# Patient Record
Sex: FEMALE | Race: WHITE | HISPANIC OR LATINO
[De-identification: names, ages, dates, MRNs, and addresses within clinical notes are randomized per-mention and may not be internally consistent; named-entity substitution may affect disease eponyms.]

---

## 2018-05-23 PROBLEM — Z00.00 ENCOUNTER FOR PREVENTIVE HEALTH EXAMINATION: Status: ACTIVE | Noted: 2018-05-23

## 2018-08-07 ENCOUNTER — APPOINTMENT (OUTPATIENT)
Dept: FAMILY MEDICINE | Facility: CLINIC | Age: 58
End: 2018-08-07

## 2019-11-25 ENCOUNTER — APPOINTMENT (OUTPATIENT)
Dept: RHEUMATOLOGY | Facility: CLINIC | Age: 59
End: 2019-11-25

## 2019-11-26 ENCOUNTER — APPOINTMENT (OUTPATIENT)
Dept: RHEUMATOLOGY | Facility: CLINIC | Age: 59
End: 2019-11-26

## 2021-08-01 ENCOUNTER — TRANSCRIPTION ENCOUNTER (OUTPATIENT)
Age: 61
End: 2021-08-01

## 2021-12-28 PROBLEM — E03.9 HYPOTHYROIDISM: Status: ACTIVE | Noted: 2021-12-28

## 2021-12-28 PROBLEM — E78.5 HYPERLIPIDEMIA: Status: ACTIVE | Noted: 2021-12-28

## 2021-12-28 PROBLEM — M19.90 ARTHRITIS: Status: ACTIVE | Noted: 2021-12-28

## 2021-12-28 RX ORDER — FLUTICASONE PROPIONATE 50 MCG
50 SPRAY, SUSPENSION NASAL
Refills: 0 | Status: ACTIVE | COMMUNITY

## 2021-12-28 RX ORDER — LEVOTHYROXINE SODIUM 0.14 MG/1
137 TABLET ORAL
Refills: 0 | Status: ACTIVE | COMMUNITY

## 2021-12-28 RX ORDER — ROSUVASTATIN CALCIUM 5 MG/1
5 TABLET, FILM COATED ORAL
Refills: 0 | Status: ACTIVE | COMMUNITY

## 2021-12-28 RX ORDER — LOSARTAN POTASSIUM 100 MG/1
100 TABLET, FILM COATED ORAL
Refills: 0 | Status: ACTIVE | COMMUNITY

## 2021-12-29 ENCOUNTER — APPOINTMENT (OUTPATIENT)
Dept: GYNECOLOGIC ONCOLOGY | Facility: CLINIC | Age: 61
End: 2021-12-29

## 2021-12-29 VITALS
HEART RATE: 79 BPM | RESPIRATION RATE: 16 BRPM | SYSTOLIC BLOOD PRESSURE: 139 MMHG | OXYGEN SATURATION: 97 % | WEIGHT: 181 LBS | DIASTOLIC BLOOD PRESSURE: 82 MMHG | HEIGHT: 60 IN | BODY MASS INDEX: 35.53 KG/M2

## 2021-12-29 DIAGNOSIS — E03.9 HYPOTHYROIDISM, UNSPECIFIED: ICD-10-CM

## 2021-12-29 DIAGNOSIS — M19.90 UNSPECIFIED OSTEOARTHRITIS, UNSPECIFIED SITE: ICD-10-CM

## 2021-12-29 DIAGNOSIS — E78.5 HYPERLIPIDEMIA, UNSPECIFIED: ICD-10-CM

## 2022-01-18 ENCOUNTER — APPOINTMENT (OUTPATIENT)
Dept: GYNECOLOGIC ONCOLOGY | Facility: CLINIC | Age: 62
End: 2022-01-18

## 2022-01-31 ENCOUNTER — FORM ENCOUNTER (OUTPATIENT)
Age: 62
End: 2022-01-31

## 2022-02-01 ENCOUNTER — APPOINTMENT (OUTPATIENT)
Dept: GYNECOLOGIC ONCOLOGY | Facility: CLINIC | Age: 62
End: 2022-02-01

## 2022-02-01 ENCOUNTER — APPOINTMENT (OUTPATIENT)
Dept: GYNECOLOGIC ONCOLOGY | Facility: CLINIC | Age: 62
End: 2022-02-01
Payer: COMMERCIAL

## 2022-02-01 VITALS
WEIGHT: 183 LBS | OXYGEN SATURATION: 97 % | DIASTOLIC BLOOD PRESSURE: 82 MMHG | HEIGHT: 60 IN | SYSTOLIC BLOOD PRESSURE: 158 MMHG | BODY MASS INDEX: 35.93 KG/M2 | HEART RATE: 84 BPM

## 2022-02-01 DIAGNOSIS — Z80.3 FAMILY HISTORY OF MALIGNANT NEOPLASM OF BREAST: ICD-10-CM

## 2022-02-01 DIAGNOSIS — R31.9 HEMATURIA, UNSPECIFIED: ICD-10-CM

## 2022-02-01 PROCEDURE — 76830 TRANSVAGINAL US NON-OB: CPT | Mod: 59

## 2022-02-01 PROCEDURE — 76857 US EXAM PELVIC LIMITED: CPT | Mod: 59

## 2022-02-01 PROCEDURE — 99204 OFFICE O/P NEW MOD 45 MIN: CPT | Mod: 25

## 2022-02-02 PROBLEM — Z80.3 FAMILY HISTORY OF MALIGNANT NEOPLASM OF BREAST: Status: ACTIVE | Noted: 2022-02-02

## 2022-02-02 RX ORDER — HYDROCHLOROTHIAZIDE 12.5 MG/1
12.5 TABLET ORAL
Qty: 90 | Refills: 0 | Status: ACTIVE | COMMUNITY
Start: 2022-01-17

## 2022-02-25 ENCOUNTER — APPOINTMENT (OUTPATIENT)
Dept: CT IMAGING | Facility: CLINIC | Age: 62
End: 2022-02-25
Payer: COMMERCIAL

## 2022-02-25 ENCOUNTER — OUTPATIENT (OUTPATIENT)
Dept: OUTPATIENT SERVICES | Facility: HOSPITAL | Age: 62
LOS: 1 days | End: 2022-02-25
Payer: COMMERCIAL

## 2022-02-25 DIAGNOSIS — R31.9 HEMATURIA, UNSPECIFIED: ICD-10-CM

## 2022-02-25 PROCEDURE — 74178 CT ABD&PLV WO CNTR FLWD CNTR: CPT

## 2022-02-25 PROCEDURE — 74178 CT ABD&PLV WO CNTR FLWD CNTR: CPT | Mod: 26

## 2022-03-01 NOTE — REVIEW OF SYSTEMS
[Dysuria] : no dysuria [Vaginal Discharge] : no vaginal discharge [Dyspareunia] : no dyspareunia [Incontinence] : no incontinence

## 2022-03-01 NOTE — ASSESSMENT
[FreeTextEntry1] : 60yo with PMB and hematuria, benign EMB.\par \par I discussed with patient that on US today her lining appears WNL. Its possible she has diagnosis of adenomyosis which could be causing the bleeding. It is also possible that her HTN is uncontrolled and could therefore also cause bleeding. Patient to monitor BP's over the next two weeks at time of VB and will report back with results. I would also like to get a CT urogram to rule out bladder as source of bleeding. If all testing negative will then recommend a hysterectomy. Patient to sign consent today to start surgical planning with assumption that other testing will be negative. \par \par I discussed at length with the patient the nature, purpose, risks, benefits, and alternatives of Robot assisted total laparoscopic hysterectomy, bilateral salpingo-oophorectom and cystoscopy.  The patient understands the risks to include (but not be limited to) bowel injury, bleeding (with the possible need for transfusion), bladder or ureteral injury, infections, deep venous thrombosis, and henrry-operative death.  The patient also understands that her surgery may not be able to be performed robotically and that she may need a laparotomy.  She also understands the limitations of robotic surgery and the possibility of missing a surgical complication with need for subsequent re-exploration.  She agrees to proceed.  She asked numerous questions which were answered to her satisfaction.  She understands the need for a pre-operative bowel preparation and agrees to comply with our instructions.  She also understands the rationale for a cystoscopy at the completion of the procedure and the potential risks of cystoscopy.\par \par

## 2022-03-01 NOTE — CHIEF COMPLAINT
[FreeTextEntry1] : [place] Office\par \par [facility] [phone] at [address] [place] [address]

## 2022-03-01 NOTE — HISTORY OF PRESENT ILLNESS
[FreeTextEntry1] : 60yo  LMP age 51 presents today with referral from Dr. Wilkinson for PMB. Patient with heavy VB prompting visit to the ED in 2021. Hg at that time noted to be 12 and patient was sent home as asymptomatic and stable. US also performed that visit revealed endometrial thickness of 15mm's. She underwent EMB on 21 with Dr. Wilkinson revealing disaggregated benign endometrium with focal breakdown and surface degenerative repair (papillary synctal metaplasia). Patient reports abnormal bleeding two years ago as well with benign D&C in . She has since has spotting. She does report new pelvic cramping over the past month or so as well as hematuria off and on. She denies any issues with bowel habits. \par \par  \par LPAP- 10/2021 negative for hpv and malignancy\par LMammo- Oct 2021, normal per pt\par LColonoscopy- , normal per pt\par LBone Density Scan- , no osteopenia or osteoporisis per pt\par

## 2022-03-01 NOTE — END OF VISIT
[FreeTextEntry3] : Written by Flori Morris PA-C, acting as scribe for Dr. Viry Vo.\par  [FreeTextEntry2] : This note accurately reflects the work and decisions made by me.\par

## 2022-03-01 NOTE — PHYSICAL EXAM
[de-identified] : 8mm sebaceous cyst noted at 9 oclock, [de-identified] : Patient was interviewed and examined with chaperone present. Name of Chaperone: Breanna Morris PA-C

## 2022-03-05 ENCOUNTER — LABORATORY RESULT (OUTPATIENT)
Age: 62
End: 2022-03-05

## 2022-03-07 LAB
APPEARANCE: CLEAR
BILIRUBIN URINE: NEGATIVE
BLOOD URINE: NEGATIVE
COLOR: YELLOW
GLUCOSE QUALITATIVE U: NEGATIVE
KETONES URINE: NEGATIVE
LEUKOCYTE ESTERASE URINE: NEGATIVE
NITRITE URINE: NEGATIVE
PH URINE: 6
PROTEIN URINE: ABNORMAL
SPECIFIC GRAVITY URINE: 1.03
UROBILINOGEN URINE: NORMAL

## 2022-03-11 ENCOUNTER — APPOINTMENT (OUTPATIENT)
Dept: GYNECOLOGIC ONCOLOGY | Facility: CLINIC | Age: 62
End: 2022-03-11

## 2022-03-11 NOTE — END OF VISIT
[FreeTextEntry3] : Follow up with PCP for clearance and evaluation of kidney function \par Proceed with scheduling TLH, BSO, cysto.  [Time Spent: ___ minutes] : I have spent [unfilled] minutes of time on the encounter.

## 2022-03-11 NOTE — HISTORY OF PRESENT ILLNESS
[Home] : at home, [unfilled] , at the time of the visit. [Other Location: e.g. Home (Enter Location, City,State)___] : at [unfilled] [Verbal consent obtained from patient] : the patient, [unfilled] [FreeTextEntry1] : Pt is a 62 yo with PMB with benign endometrial biopsy. She also had hematuria for which we ordered a CT scan. She presents to discuss results. No new gynecologic issues.

## 2022-03-11 NOTE — ASSESSMENT
[FreeTextEntry1] : Pt is a 60 yo with PMB and benign endometrial biopsy. No concerning findings on CT scan. We discussed that given the UA a urinary tract infection is unlikely. She does have protein in the urine, so there could be concern for kidney damage. We will be getting a serum creatinine preoperatively. I would like her to follow up with her PCP. I also encouraged her to start monitoring her BP daily and starting to keep a calendar as uncontrolled hypertension could result in these findings. I do not have a good explanation for the air bubbles in the bladder, but we will perform cystoscopy during the surgery to evaluate the bladder. \par \par We discussed that we will proceed with the hysterectomy given no concerning findings, but we do need to establish that her blood pressure is well controlled and optimized prior to surgery.

## 2022-03-31 ENCOUNTER — FORM ENCOUNTER (OUTPATIENT)
Age: 62
End: 2022-03-31

## 2022-04-13 ENCOUNTER — FORM ENCOUNTER (OUTPATIENT)
Age: 62
End: 2022-04-13

## 2022-04-28 ENCOUNTER — APPOINTMENT (OUTPATIENT)
Dept: GYNECOLOGIC ONCOLOGY | Facility: CLINIC | Age: 62
End: 2022-04-28

## 2022-07-28 ENCOUNTER — APPOINTMENT (OUTPATIENT)
Dept: GYNECOLOGIC ONCOLOGY | Facility: CLINIC | Age: 62
End: 2022-07-28
Payer: COMMERCIAL

## 2022-07-28 DIAGNOSIS — I10 ESSENTIAL (PRIMARY) HYPERTENSION: ICD-10-CM

## 2022-07-28 PROCEDURE — 99213 OFFICE O/P EST LOW 20 MIN: CPT

## 2022-07-28 NOTE — REASON FOR VISIT
[FreeTextEntry1] : [place] Location\par \par [facility] [place]. [phone]\par

## 2022-07-28 NOTE — ASSESSMENT
[FreeTextEntry1] : Pt is a 61 yo with PMB and benign endometrial biopsy. No concerning findings on CT scan. Scheduled for TLH, BSO cysto at [facility] on 9/8/22. She has no new concerns but reports she is having some spotting. \par \par

## 2022-07-28 NOTE — HISTORY OF PRESENT ILLNESS
[FreeTextEntry1] : Pt is a 61 yo with PMB and benign endometrial biopsy. No concerning findings on CT scan. She was last seen in March and signed consent for surgery ([facility] BSO cysto). She is scheduled for surgery on 9/8/22. She returns to the office today for pre op questions and to sign consent form.

## 2022-09-08 ENCOUNTER — RESULT REVIEW (OUTPATIENT)
Age: 62
End: 2022-09-08

## 2022-09-19 ENCOUNTER — NON-APPOINTMENT (OUTPATIENT)
Age: 62
End: 2022-09-19

## 2022-09-20 ENCOUNTER — FORM ENCOUNTER (OUTPATIENT)
Age: 62
End: 2022-09-20

## 2022-09-21 ENCOUNTER — APPOINTMENT (OUTPATIENT)
Dept: GYNECOLOGIC ONCOLOGY | Facility: CLINIC | Age: 62
End: 2022-09-21

## 2022-09-22 ENCOUNTER — APPOINTMENT (OUTPATIENT)
Dept: GYNECOLOGIC ONCOLOGY | Facility: CLINIC | Age: 62
End: 2022-09-22

## 2022-09-22 DIAGNOSIS — N76.0 ACUTE VAGINITIS: ICD-10-CM

## 2022-09-22 DIAGNOSIS — B96.89 ACUTE VAGINITIS: ICD-10-CM

## 2022-09-22 PROCEDURE — 99213 OFFICE O/P EST LOW 20 MIN: CPT | Mod: 24

## 2022-09-23 PROBLEM — N76.0 BACTERIAL VAGINOSIS: Status: RESOLVED | Noted: 2022-09-23 | Resolved: 2022-10-23

## 2022-09-23 RX ORDER — METOPROLOL SUCCINATE 25 MG/1
25 TABLET, EXTENDED RELEASE ORAL
Qty: 30 | Refills: 0 | Status: ACTIVE | COMMUNITY
Start: 2022-06-13

## 2022-09-23 RX ORDER — PANTOPRAZOLE 40 MG/1
40 TABLET, DELAYED RELEASE ORAL
Qty: 90 | Refills: 0 | Status: ACTIVE | COMMUNITY
Start: 2021-10-26

## 2022-09-23 RX ORDER — OLOPATADINE HYDROCHLORIDE 665 UG/1
0.6 SPRAY, METERED NASAL
Qty: 30 | Refills: 0 | Status: ACTIVE | COMMUNITY
Start: 2022-06-14

## 2022-09-23 NOTE — DISCUSSION/SUMMARY
[FreeTextEntry1] : Pertinent findings revealed 7 cm uterus, normal bilateral ovaires and fallopian tubes. No concerns for intra-abdominal metastatic disease. Normal upper abdomen, cystoscopy: normal bilateral ureteral jets, no injury to the bladder. \par \par Diagnosis\par A. Uterus, cervix, bilateral fallopian tubes and ovaries, hysterectomy and bilateral salpingecto\par my:\par Cervix:\par *  No significant pathologic diagnosis.\par Endometrium:\par *  Weakly proliferative endometrium.\par Myometrium:\par \par *  Leiomyoma and adenomyosis.\par Ovaries:\par *  Bilateral ovaries with no significant pathologic diagnosis.\par Fallopian tubes:\par *  Bilateral fallopian tubes with no significant pathologic diagnoses.\par \par B. Additional vaginal cuff, excision:\par *   Benign squamous mucosa

## 2022-09-23 NOTE — REASON FOR VISIT
[de-identified] : 9/8/22 [de-identified] : Robotic assisted laparoscopic hysterectomy, bilateral salping-oophorectomy, uterosacral ligament fixation, cystoscopy at [facility] for post menopausal bleeding.  [de-identified] : Patient has recovered well from her surgery, Denies any SOB, abnormal pain or VB. Bowel and bladder function are wnl. Patient states she has been having some brown foul smelling discharge, no fevers/chills, no nausea/vomiting.

## 2022-09-23 NOTE — ASSESSMENT
[FreeTextEntry1] : Pt is a 61 yo s/p TRH, BSO, for postmenopausal bleeding, she reports foul smelling discharge consistent with BV. Will treat with metronidazole. Recovering well. Pathology with adenomyosis but no hyperplasia or invasisve diseaes.

## 2022-10-27 ENCOUNTER — APPOINTMENT (OUTPATIENT)
Dept: GYNECOLOGIC ONCOLOGY | Facility: CLINIC | Age: 62
End: 2022-10-27

## 2022-10-27 DIAGNOSIS — N80.0 ENDOMETRIOSIS OF UTERUS: ICD-10-CM

## 2022-10-27 DIAGNOSIS — N95.0 POSTMENOPAUSAL BLEEDING: ICD-10-CM

## 2022-10-27 PROCEDURE — 99024 POSTOP FOLLOW-UP VISIT: CPT

## 2022-10-27 NOTE — REASON FOR VISIT
[Other ___] : [unfilled] [de-identified] : 9/8/22 [de-identified] :  Robotic assisted laparoscopic hysterectomy, bilateral salping-oophorectomy, uterosacral ligament fixation, cystoscopy at [facility] for post menopausal bleeding. [de-identified] : Patient was seen post operatively on 9/22/22 and had admitted to having some brown foul smelling discharge, denied fevers/chills, no nausea/vomitting. Discussed with patient consistent with BV, Metronidazole prescirbed.

## 2022-10-27 NOTE — DISCUSSION/SUMMARY
[Clean] : was clean [Dry] : was dry [Intact] : was intact [Erythema] : was not erythematous [Ecchymosis] : was not ecchymotic [Seroma] : had no seroma [None] : had no drainage [Normal Skin] : normal appearance [Firm] : soft [Tender] : nontender [Abnormal Bowel Sounds] : normal bowel sounds [Rebound] : no rebound tenderness [Guarding] : no guarding [Mass] : no palpable mass [External Genitalia Abnormal] : normal external genitalia [Vaginal Exam Abnormal] : normal vaginal exam [Doing Well] : is doing well [Excellent Pain Control] : has excellent pain control [No Sign of Infection] : is showing no signs of infection [de-identified] : + cuff well ehaled

## 2022-10-27 NOTE — END OF VISIT
[FreeTextEntry3] : Discharge to routine gynecologic care [FreeTextEntry2] : Catia Rangel MA was present the entire duration of the patient interaction and gynecological exam.\par

## 2022-10-27 NOTE — ASSESSMENT
[FreeTextEntry1] : Pt is a 63 yo s/p TRH, BSO, for postmenopausal bleeding, Pathology with adenomyosis but no hyperplasia or invasive disease. Cuff well healed.

## 2023-07-05 ENCOUNTER — RESULT CHARGE (OUTPATIENT)
Age: 63
End: 2023-07-05

## 2023-07-05 ENCOUNTER — APPOINTMENT (OUTPATIENT)
Dept: UROGYNECOLOGY | Facility: CLINIC | Age: 63
End: 2023-07-05
Payer: MEDICARE

## 2023-07-05 VITALS
DIASTOLIC BLOOD PRESSURE: 89 MMHG | BODY MASS INDEX: 35.73 KG/M2 | SYSTOLIC BLOOD PRESSURE: 147 MMHG | WEIGHT: 182 LBS | HEIGHT: 60 IN

## 2023-07-05 DIAGNOSIS — N39.0 URINARY TRACT INFECTION, SITE NOT SPECIFIED: ICD-10-CM

## 2023-07-05 DIAGNOSIS — N90.5 ATROPHY OF VULVA: ICD-10-CM

## 2023-07-05 LAB
BILIRUB UR QL STRIP: NEGATIVE
CLARITY UR: CLEAR
COLLECTION METHOD: NORMAL
GLUCOSE UR-MCNC: NEGATIVE
HCG UR QL: 0.2 EU/DL
HGB UR QL STRIP.AUTO: NEGATIVE
KETONES UR-MCNC: NEGATIVE
LEUKOCYTE ESTERASE UR QL STRIP: NEGATIVE
NITRITE UR QL STRIP: NEGATIVE
PH UR STRIP: 6
PROT UR STRIP-MCNC: NEGATIVE
SP GR UR STRIP: 1.01

## 2023-07-05 PROCEDURE — 99204 OFFICE O/P NEW MOD 45 MIN: CPT | Mod: 25

## 2023-07-05 PROCEDURE — 51701 INSERT BLADDER CATHETER: CPT | Mod: 59

## 2023-07-05 NOTE — DISCUSSION/SUMMARY
[FreeTextEntry1] : \dacia Lopes presents for eval of recurrent UTI, negative cysto/CT urogram 2022 after symptoms started.\par \par She does not have culture results available. We discussed etiology and management of recurrent urinary tract infections. We discussed behavioral modifications including increased oral intake, cranberry tablets, wiping front to back, d mannose. We discussed management of recurrent UTIs with vaginal estrogen and antibiotic prophylaxis. She is not interested in vaginal estrogen and discussed nonhormonal vaginal lubricants.   We discussed that is she is symptomatic I prefer for catheterized specimen however if she cannot come to the office, discussed u/a and culture prior to antibiotic treatment.\par \par [] u/a cx\par [] estrace\par [] f/u in 2 months

## 2023-07-05 NOTE — HISTORY OF PRESENT ILLNESS
[Uterine Prolapse] : no [Vaginal Wall Prolapse] : no [Rectal Prolapse] : no [Unable To Restrain Bowel Movement] : no [Hematuria] : no [x2] : nocturia two times a night [Feelings Of Urinary Urgency] : no [Pain During Urination (Dysuria)] : no [Urinary Tract Infection] : moderate [] : no [FreeTextEntry1] : \par Marianne presents for eval of recurrent UTI with gross hematuria, she reports she had symptoms again recently including dysuria, frequency/urgency and self medications with keflex X 1 day and then macrobid X 2 days, she did see hematuria, she was told to get a bladder sonogram, she denies bulge, rare FLORIAN, reports frequency, nocturia 1-2 times, if she has to go she has urgency, no bulge, no pelvic pain, no constipation, no leakage of stool, not sexually active \par \par CT urogram reviewed 2/2022- negative\par cystoscopy at time of OR 02/22 negative \par renal sono 6/2023: negative (fatty liver)\par has chronic fatigue post covid \par \par 05/2022 neg cytology\par saw urology \par \par s/p robotic TLH/BSO/USLS/cystoscopy at [place]

## 2023-07-05 NOTE — PROCEDURE
[Straight Catheterization] : insertion of a straight catheter [Urinary Frequency] : urinary frequency [Patient] : the patient [___ Fr Straight Tip] : a [unfilled] in [place] straight tip catheter [None] : there were no complications with the catheter insertion [No Complications] : no complications [Tolerated Well] : the patient tolerated the procedure well [Post procedure instructions and information given] : Post procedure instructions and information were given and reviewed with patient. [0] : 0

## 2023-07-05 NOTE — PHYSICAL EXAM
[Chaperone Present] : A chaperone was present in the examining room during all aspects of the physical examination [No Acute Distress] : in no acute distress [Well developed] : well developed [Well Nourished] : ~L well nourished [Oriented x3] : oriented to person, place, and time [Normal Memory] : ~T memory was ~L unimpaired [Normal Mood/Affect] : mood and affect are normal [Cough] : no cough [No Edema] : ~T edema was not present [Tenderness] : ~T no ~M abdominal tenderness observed [Distended] : not distended [Inguinal LAD] : no adenopathy was noted in the inguinal lymph nodes [Warm and Dry] : was warm and dry to touch [Normal Gait] : gait was normal [Labia Majora] : were normal [Labia Minora] : were normal [Normal Appearance] : general appearance was normal [Atrophy] : atrophy [2] : 2 [Absent] : absent [Normal] : no abnormalities [Post Void Residual ____ml] : post void residual was [unfilled] ml [de-identified] : no POP

## 2023-08-23 ENCOUNTER — RESULT CHARGE (OUTPATIENT)
Age: 63
End: 2023-08-23

## 2023-08-23 ENCOUNTER — APPOINTMENT (OUTPATIENT)
Dept: UROGYNECOLOGY | Facility: CLINIC | Age: 63
End: 2023-08-23
Payer: MEDICARE

## 2023-08-23 DIAGNOSIS — R31.0 GROSS HEMATURIA: ICD-10-CM

## 2023-08-23 LAB
BILIRUB UR QL STRIP: NEGATIVE
CLARITY UR: CLEAR
COLLECTION METHOD: NORMAL
GLUCOSE UR-MCNC: NEGATIVE
HCG UR QL: 0.2 EU/DL
HGB UR QL STRIP.AUTO: NORMAL
KETONES UR-MCNC: NEGATIVE
LEUKOCYTE ESTERASE UR QL STRIP: NORMAL
NITRITE UR QL STRIP: NEGATIVE
PH UR STRIP: 6
PROT UR STRIP-MCNC: NEGATIVE
SP GR UR STRIP: 1.01

## 2023-08-23 PROCEDURE — 51701 INSERT BLADDER CATHETER: CPT | Mod: 59

## 2023-08-23 PROCEDURE — 99213 OFFICE O/P EST LOW 20 MIN: CPT | Mod: 25

## 2023-08-23 PROCEDURE — 81003 URINALYSIS AUTO W/O SCOPE: CPT | Mod: QW

## 2023-08-23 NOTE — PHYSICAL EXAM
[No Acute Distress] : in no acute distress [Well developed] : well developed [Well Nourished] : ~L well nourished [Good Hygeine] : demonstrates good hygeine [Oriented x3] : oriented to person, place, and time [Normal Memory] : ~T memory was ~L unimpaired [Normal Mood/Affect] : mood and affect are normal [Soft, Nontender] : the abdomen was soft and nontender [None] : no CVA tenderness [Warm and Dry] : was warm and dry to touch [Normal Gait] : gait was normal [Vulvar Atrophy] : vulvar atrophy [Labia Majora] : were normal [Labia Minora] : were normal [Normal] : was normal

## 2023-08-23 NOTE — HISTORY OF PRESENT ILLNESS
[FreeTextEntry1] : Pt presents to office for urgent visit with c/o ?UTI.  Notes she has had dysuria, increased frequency for about 1 week.  She saw her PMD last week, was told the urine "looked ok" but was going to send for a culture.  Per pt, she called PMD office this morning for results and was told the urine got lost.  Pt now with some lower back pain so she was given cipro from her PMD this AM and told to get renal sono.  She has not started the antibiotics yet.  She has hx gross hematuria with w/u done in 2/2022.  Renal sono 6/2023.  She has seen urology.

## 2023-08-23 NOTE — DISCUSSION/SUMMARY
[FreeTextEntry1] : In office dip with moderate blood and small leuks.  Will send UA CS.  Discussed that we can wait for results or she can start the cipro rx'd by PMD.  She would like to start abx.  She will also go for renal sono per PMD.  Will call if abx need to be changed.  Advised to go to ED if symptoms worsen.  Advised adequate water intake.  Instructed to call with any questions or concerns and she verbalizes understanding.

## 2023-08-23 NOTE — PROCEDURE
[Straight Catheterization] : insertion of a straight catheter [Urinary Tract Infection] : a urinary tract infection [Urinary Frequency] : urinary frequency [Patient] : the patient [___ Fr Straight Tip] : a [unfilled] in [place] straight tip catheter [None] : there were no complications with the catheter insertion [Clear] : clear [No Complications] : no complications [Tolerated Well] : the patient tolerated the procedure well [Post procedure instructions and information given] : Post procedure instructions and information were given and reviewed with patient. [0] : 0 [Culture] : culture [Urinalysis] : urinalysis

## 2023-08-24 LAB
APPEARANCE: CLEAR
BACTERIA: NEGATIVE /HPF
BILIRUBIN URINE: NEGATIVE
BLOOD URINE: ABNORMAL
CAST: 0 /LPF
COLOR: YELLOW
EPITHELIAL CELLS: 1 /HPF
GLUCOSE QUALITATIVE U: NEGATIVE MG/DL
KETONES URINE: NEGATIVE MG/DL
LEUKOCYTE ESTERASE URINE: ABNORMAL
MICROSCOPIC-UA: NORMAL
NITRITE URINE: NEGATIVE
PH URINE: 6
PROTEIN URINE: NEGATIVE MG/DL
RED BLOOD CELLS URINE: 1 /HPF
REVIEW: NORMAL
SPECIFIC GRAVITY URINE: 1.01
UROBILINOGEN URINE: 0.2 MG/DL
WHITE BLOOD CELLS URINE: 2 /HPF

## 2023-08-28 LAB — BACTERIA UR CULT: ABNORMAL

## 2023-09-25 ENCOUNTER — APPOINTMENT (OUTPATIENT)
Dept: UROGYNECOLOGY | Facility: CLINIC | Age: 63
End: 2023-09-25

## 2023-11-28 ENCOUNTER — APPOINTMENT (OUTPATIENT)
Dept: UROGYNECOLOGY | Facility: CLINIC | Age: 63
End: 2023-11-28
Payer: MEDICARE

## 2023-11-28 DIAGNOSIS — R30.0 DYSURIA: ICD-10-CM

## 2023-11-28 DIAGNOSIS — R35.0 FREQUENCY OF MICTURITION: ICD-10-CM

## 2023-11-28 LAB
BILIRUB UR QL STRIP: NEGATIVE
CLARITY UR: CLEAR
COLLECTION METHOD: NORMAL
GLUCOSE UR-MCNC: NEGATIVE
HCG UR QL: 0.2 EU/DL
HGB UR QL STRIP.AUTO: NORMAL
KETONES UR-MCNC: NEGATIVE
LEUKOCYTE ESTERASE UR QL STRIP: NORMAL
NITRITE UR QL STRIP: NEGATIVE
PH UR STRIP: 6
PROT UR STRIP-MCNC: NEGATIVE
SP GR UR STRIP: 1.02

## 2023-11-28 PROCEDURE — 99213 OFFICE O/P EST LOW 20 MIN: CPT | Mod: 25

## 2023-11-28 PROCEDURE — 81003 URINALYSIS AUTO W/O SCOPE: CPT | Mod: QW

## 2023-11-28 PROCEDURE — 51701 INSERT BLADDER CATHETER: CPT | Mod: 59

## 2023-11-29 LAB
APPEARANCE: CLEAR
BACTERIA: NEGATIVE /HPF
BILIRUBIN URINE: NEGATIVE
BLOOD URINE: ABNORMAL
CAST: 0 /LPF
COLOR: YELLOW
EPITHELIAL CELLS: 1 /HPF
GLUCOSE QUALITATIVE U: NEGATIVE MG/DL
KETONES URINE: NEGATIVE MG/DL
LEUKOCYTE ESTERASE URINE: ABNORMAL
MICROSCOPIC-UA: NORMAL
NITRITE URINE: NEGATIVE
PH URINE: 6
PROTEIN URINE: NEGATIVE MG/DL
RED BLOOD CELLS URINE: 4 /HPF
REVIEW: NORMAL
SPECIFIC GRAVITY URINE: 1.01
UROBILINOGEN URINE: 0.2 MG/DL
WHITE BLOOD CELLS URINE: 3 /HPF

## 2023-12-04 ENCOUNTER — APPOINTMENT (OUTPATIENT)
Dept: UROGYNECOLOGY | Facility: CLINIC | Age: 63
End: 2023-12-04

## 2023-12-04 LAB — BACTERIA UR CULT: ABNORMAL

## 2024-06-13 ENCOUNTER — RX RENEWAL (OUTPATIENT)
Age: 64
End: 2024-06-13

## 2024-07-01 ENCOUNTER — APPOINTMENT (OUTPATIENT)
Dept: PULMONOLOGY | Facility: CLINIC | Age: 64
End: 2024-07-01
Payer: MEDICARE

## 2024-07-01 VITALS
SYSTOLIC BLOOD PRESSURE: 130 MMHG | WEIGHT: 172 LBS | OXYGEN SATURATION: 96 % | BODY MASS INDEX: 33.77 KG/M2 | DIASTOLIC BLOOD PRESSURE: 84 MMHG | HEART RATE: 73 BPM | HEIGHT: 60 IN | TEMPERATURE: 96.2 F

## 2024-07-01 DIAGNOSIS — Z87.39 PERSONAL HISTORY OF OTHER DISEASES OF THE MUSCULOSKELETAL SYSTEM AND CONNECTIVE TISSUE: ICD-10-CM

## 2024-07-01 DIAGNOSIS — J18.9 PNEUMONIA, UNSPECIFIED ORGANISM: ICD-10-CM

## 2024-07-01 DIAGNOSIS — Z82.5 FAMILY HISTORY OF ASTHMA AND OTHER CHRONIC LOWER RESPIRATORY DISEASES: ICD-10-CM

## 2024-07-01 DIAGNOSIS — U07.1 COVID-19: ICD-10-CM

## 2024-07-01 PROCEDURE — 94729 DIFFUSING CAPACITY: CPT

## 2024-07-01 PROCEDURE — 94010 BREATHING CAPACITY TEST: CPT

## 2024-07-01 PROCEDURE — 99205 OFFICE O/P NEW HI 60 MIN: CPT | Mod: 25

## 2024-07-01 PROCEDURE — 94727 GAS DIL/WSHOT DETER LNG VOL: CPT
